# Patient Record
Sex: MALE | Race: WHITE | NOT HISPANIC OR LATINO | Employment: FULL TIME | URBAN - METROPOLITAN AREA
[De-identification: names, ages, dates, MRNs, and addresses within clinical notes are randomized per-mention and may not be internally consistent; named-entity substitution may affect disease eponyms.]

---

## 2024-02-28 ENCOUNTER — OFFICE VISIT (OUTPATIENT)
Dept: PRIMARY CARE | Facility: CLINIC | Age: 34
End: 2024-02-28
Payer: COMMERCIAL

## 2024-02-28 VITALS
SYSTOLIC BLOOD PRESSURE: 122 MMHG | DIASTOLIC BLOOD PRESSURE: 60 MMHG | WEIGHT: 249.8 LBS | HEIGHT: 69 IN | OXYGEN SATURATION: 97 % | HEART RATE: 85 BPM | BODY MASS INDEX: 37 KG/M2

## 2024-02-28 DIAGNOSIS — Z00.00 ROUTINE GENERAL MEDICAL EXAMINATION AT A HEALTH CARE FACILITY: Primary | ICD-10-CM

## 2024-02-28 DIAGNOSIS — Z80.0 FAMILY HISTORY OF PANCREATIC CANCER: ICD-10-CM

## 2024-02-28 DIAGNOSIS — F90.9 ATTENTION DEFICIT HYPERACTIVITY DISORDER (ADHD), UNSPECIFIED ADHD TYPE: ICD-10-CM

## 2024-02-28 DIAGNOSIS — Z11.3 SCREEN FOR STD (SEXUALLY TRANSMITTED DISEASE): ICD-10-CM

## 2024-02-28 DIAGNOSIS — F51.01 PRIMARY INSOMNIA: ICD-10-CM

## 2024-02-28 DIAGNOSIS — F32.A DEPRESSION, UNSPECIFIED DEPRESSION TYPE: ICD-10-CM

## 2024-02-28 PROCEDURE — 1036F TOBACCO NON-USER: CPT | Performed by: FAMILY MEDICINE

## 2024-02-28 PROCEDURE — 99385 PREV VISIT NEW AGE 18-39: CPT | Performed by: FAMILY MEDICINE

## 2024-02-28 RX ORDER — BUPROPION HYDROCHLORIDE 150 MG/1
150 TABLET ORAL DAILY
COMMUNITY
End: 2024-04-24 | Stop reason: DRUGHIGH

## 2024-02-28 RX ORDER — BUPROPION HYDROCHLORIDE 300 MG/1
300 TABLET ORAL EVERY MORNING
Qty: 30 TABLET | Refills: 2 | Status: SHIPPED | OUTPATIENT
Start: 2024-02-28

## 2024-02-28 RX ORDER — TRAZODONE HYDROCHLORIDE 50 MG/1
TABLET ORAL
Qty: 60 TABLET | Refills: 2 | Status: SHIPPED | OUTPATIENT
Start: 2024-02-28 | End: 2024-05-01 | Stop reason: WASHOUT

## 2024-02-28 NOTE — PROGRESS NOTES
Subjective   Patient ID: Brennon David is a 33 y.o. male who presents for Establish Care, ADHD, Depression, and Exposure to STD.///Annual exam and checkup    HPI annual exam and checkup    Pt is here to establish care   He denies ever having cardiac issues.   He vapes nicotine and THC.    Pt is requesting to get a cancer screening  He is requesting BRCA testing.  His mom had pancreatic cancer.      Pt would like to get std testing and blood work   He dates girls  Denies burning, discharge from penis.     PT would like to discuss an option for his ADHD and depression.   He states 2 years ago he started losing motivation and weight.  He did get put on Wellbutrin 2-3 months ago.  He was treated for ADHD when he was a kid and in his 20s.  He states the Adderall made him jittery and made him anxious.     States he does not sleep well.   He felt drowsy around 10 but then he would not have woken up for his appointment.  His father had sleep apnea prior to losing weight.     Review of systems  ; Patient seen today for exam denies any problems with headaches or vision, denies any shortness of breath chest pain nausea or vomiting, no black stool no blood in the stool no heartburn type symptoms denies any problems with constipation or diarrhea, and no dysuria-type symptoms    The patient's allergies medications were reviewed with them today    The patient's social family and surgical history or also reviewed here today, along with her past medical history.     Objective     Alert and active in  no acute distress  HEENT TMs clear oropharynx negative nares clear no drainage noted neck supple  With no adenopathy   Heart regular rate and rhythm without murmur and no carotid bruits  Lungs- clear to auscultation bilaterally, no wheeze or rhonchi noted  Thyroid -negative masses or nodularity  Abdomen- soft times four quadrants , bowel sounds positive no masses or organomegaly, negative tenderness guarding or rebound  Neurological  "exam unremarkable- DTRs in upper and lower extremities within normal limits.   skin -no lesions noted      /60   Pulse 85   Ht 1.753 m (5' 9\")   Wt 113 kg (249 lb 12.8 oz)   SpO2 97%   BMI 36.89 kg/m²     Allergies   Allergen Reactions    Adderall [Dextroamphetamine-Amphetamine] Other     Jittery, anxious       Assessment/Plan   Problem List Items Addressed This Visit    None  Visit Diagnoses       Routine general medical examination at a health care facility        Relevant Orders    CBC and Auto Differential    Comprehensive Metabolic Panel    Lipid Panel    Attention deficit hyperactivity disorder (ADHD), unspecified ADHD type        Depression, unspecified depression type        Relevant Medications    buPROPion XL (Wellbutrin XL) 300 mg 24 hr tablet    Other Relevant Orders    Follow Up In [facility]    TSH with reflex to Free T4 if abnormal    Family history of pancreatic cancer        Relevant Orders    Referral to Genetics    Primary insomnia        Relevant Medications    traZODone (Desyrel) 50 mg tablet    Screen for STD (sexually transmitted disease)        Relevant Orders    HIV 1/2 Antigen/Antibody Screen with Reflex to Confirmation    Hepatitis C Antibody    Syphilis Screen with Reflex    HSV1 IgG and HSV2 IgG          Importance of smoking cessation discussed.     Recommend he discontinue Marijuana use.     Referral to [facility] Lim ordered.    Increase Wellbutrin to 300 mg.     Referral to genetics ordered.    Trazodone prescribed today.     Discussed a sleep pattern to help him fall asleep more easily.     Discussed safe sex practices.    Labs have been ordered, she/he will have these performed and we will contact her/him with results.  (CBC, CMP, Lipid, TSH w Reflex, STD testing)    If anything worsens or changes please call us at once, follow up in the office as planned.    Scribe Attestation  By signing my name below, Yee ALEJANDRA " ALICE Castano, Teresa   attest that this documentation has been prepared under the direction and in the presence of Arya Dawson DO.

## 2024-03-01 ENCOUNTER — LAB (OUTPATIENT)
Dept: LAB | Facility: LAB | Age: 34
End: 2024-03-01
Payer: COMMERCIAL

## 2024-03-01 DIAGNOSIS — Z00.00 ROUTINE GENERAL MEDICAL EXAMINATION AT A HEALTH CARE FACILITY: ICD-10-CM

## 2024-03-01 DIAGNOSIS — F32.A DEPRESSION, UNSPECIFIED DEPRESSION TYPE: ICD-10-CM

## 2024-03-01 DIAGNOSIS — Z11.3 SCREEN FOR STD (SEXUALLY TRANSMITTED DISEASE): ICD-10-CM

## 2024-03-01 LAB
ALBUMIN SERPL BCP-MCNC: 4.5 G/DL (ref 3.4–5)
ALP SERPL-CCNC: 52 U/L (ref 33–120)
ALT SERPL W P-5'-P-CCNC: 23 U/L (ref 10–52)
ANION GAP SERPL CALC-SCNC: 12 MMOL/L (ref 10–20)
AST SERPL W P-5'-P-CCNC: 19 U/L (ref 9–39)
BASOPHILS # BLD AUTO: 0.05 X10*3/UL (ref 0–0.1)
BASOPHILS NFR BLD AUTO: 0.7 %
BILIRUB SERPL-MCNC: 0.5 MG/DL (ref 0–1.2)
BUN SERPL-MCNC: 10 MG/DL (ref 6–23)
CALCIUM SERPL-MCNC: 9.5 MG/DL (ref 8.6–10.3)
CHLORIDE SERPL-SCNC: 105 MMOL/L (ref 98–107)
CHOLEST SERPL-MCNC: 189 MG/DL (ref 0–199)
CHOLESTEROL/HDL RATIO: 3.4
CO2 SERPL-SCNC: 27 MMOL/L (ref 21–32)
CREAT SERPL-MCNC: 1.16 MG/DL (ref 0.5–1.3)
EGFRCR SERPLBLD CKD-EPI 2021: 85 ML/MIN/1.73M*2
EOSINOPHIL # BLD AUTO: 0.16 X10*3/UL (ref 0–0.7)
EOSINOPHIL NFR BLD AUTO: 2.4 %
ERYTHROCYTE [DISTWIDTH] IN BLOOD BY AUTOMATED COUNT: 12.1 % (ref 11.5–14.5)
GLUCOSE SERPL-MCNC: 98 MG/DL (ref 74–99)
HCT VFR BLD AUTO: 45.6 % (ref 41–52)
HDLC SERPL-MCNC: 55.2 MG/DL
HGB BLD-MCNC: 15.6 G/DL (ref 13.5–17.5)
IMM GRANULOCYTES # BLD AUTO: 0.04 X10*3/UL (ref 0–0.7)
IMM GRANULOCYTES NFR BLD AUTO: 0.6 % (ref 0–0.9)
LDLC SERPL CALC-MCNC: 117 MG/DL
LYMPHOCYTES # BLD AUTO: 1.97 X10*3/UL (ref 1.2–4.8)
LYMPHOCYTES NFR BLD AUTO: 29.4 %
MCH RBC QN AUTO: 31.3 PG (ref 26–34)
MCHC RBC AUTO-ENTMCNC: 34.2 G/DL (ref 32–36)
MCV RBC AUTO: 91 FL (ref 80–100)
MONOCYTES # BLD AUTO: 0.55 X10*3/UL (ref 0.1–1)
MONOCYTES NFR BLD AUTO: 8.2 %
NEUTROPHILS # BLD AUTO: 3.93 X10*3/UL (ref 1.2–7.7)
NEUTROPHILS NFR BLD AUTO: 58.7 %
NON HDL CHOLESTEROL: 134 MG/DL (ref 0–149)
NRBC BLD-RTO: 0 /100 WBCS (ref 0–0)
PLATELET # BLD AUTO: 205 X10*3/UL (ref 150–450)
POTASSIUM SERPL-SCNC: 4.1 MMOL/L (ref 3.5–5.3)
PROT SERPL-MCNC: 6.4 G/DL (ref 6.4–8.2)
RBC # BLD AUTO: 4.99 X10*6/UL (ref 4.5–5.9)
SODIUM SERPL-SCNC: 140 MMOL/L (ref 136–145)
TRIGL SERPL-MCNC: 83 MG/DL (ref 0–149)
TSH SERPL-ACNC: 0.72 MIU/L (ref 0.44–3.98)
VLDL: 17 MG/DL (ref 0–40)
WBC # BLD AUTO: 6.7 X10*3/UL (ref 4.4–11.3)

## 2024-03-01 PROCEDURE — 86695 HERPES SIMPLEX TYPE 1 TEST: CPT

## 2024-03-01 PROCEDURE — 80053 COMPREHEN METABOLIC PANEL: CPT

## 2024-03-01 PROCEDURE — 86780 TREPONEMA PALLIDUM: CPT

## 2024-03-01 PROCEDURE — 85025 COMPLETE CBC W/AUTO DIFF WBC: CPT

## 2024-03-01 PROCEDURE — 87389 HIV-1 AG W/HIV-1&-2 AB AG IA: CPT

## 2024-03-01 PROCEDURE — 86696 HERPES SIMPLEX TYPE 2 TEST: CPT

## 2024-03-01 PROCEDURE — 80061 LIPID PANEL: CPT

## 2024-03-01 PROCEDURE — 36415 COLL VENOUS BLD VENIPUNCTURE: CPT

## 2024-03-01 PROCEDURE — 86803 HEPATITIS C AB TEST: CPT

## 2024-03-01 PROCEDURE — 84443 ASSAY THYROID STIM HORMONE: CPT

## 2024-03-02 LAB
HCV AB SER QL: NONREACTIVE
HERPES SIMPLEX VIRUS 1 IGG: 0.2 INDEX
HERPES SIMPLEX VIRUS 2 IGG: <0.2 INDEX
HIV 1+2 AB+HIV1 P24 AG SERPL QL IA: NONREACTIVE
TREPONEMA PALLIDUM IGG+IGM AB [PRESENCE] IN SERUM OR PLASMA BY IMMUNOASSAY: NONREACTIVE

## 2024-03-18 ENCOUNTER — SOCIAL WORK (OUTPATIENT)
Dept: PRIMARY CARE | Facility: CLINIC | Age: 34
End: 2024-03-18
Payer: COMMERCIAL

## 2024-03-18 ASSESSMENT — PATIENT HEALTH QUESTIONNAIRE - PHQ9
10. IF YOU CHECKED OFF ANY PROBLEMS, HOW DIFFICULT HAVE THESE PROBLEMS MADE IT FOR YOU TO DO YOUR WORK, TAKE CARE OF THINGS AT HOME, OR GET ALONG WITH OTHER PEOPLE: VERY DIFFICULT
3. TROUBLE FALLING OR STAYING ASLEEP: NEARLY EVERY DAY
8. MOVING OR SPEAKING SO SLOWLY THAT OTHER PEOPLE COULD HAVE NOTICED. OR THE OPPOSITE, BEING SO FIGETY OR RESTLESS THAT YOU HAVE BEEN MOVING AROUND A LOT MORE THAN USUAL: SEVERAL DAYS
2. FEELING DOWN, DEPRESSED OR HOPELESS: NEARLY EVERY DAY
7. TROUBLE CONCENTRATING ON THINGS, SUCH AS READING THE NEWSPAPER OR WATCHING TELEVISION: NEARLY EVERY DAY
9. THOUGHTS THAT YOU WOULD BE BETTER OFF DEAD, OR OF HURTING YOURSELF: NOT AT ALL
6. FEELING BAD ABOUT YOURSELF - OR THAT YOU ARE A FAILURE OR HAVE LET YOURSELF OR YOUR FAMILY DOWN: NEARLY EVERY DAY
SUM OF ALL RESPONSES TO PHQ9 QUESTIONS 1 & 2: 6
1. LITTLE INTEREST OR PLEASURE IN DOING THINGS: NEARLY EVERY DAY
SUM OF ALL RESPONSES TO PHQ QUESTIONS 1-9: 22
4. FEELING TIRED OR HAVING LITTLE ENERGY: NEARLY EVERY DAY
5. POOR APPETITE OR OVEREATING: NEARLY EVERY DAY

## 2024-03-18 ASSESSMENT — ANXIETY QUESTIONNAIRES
IF YOU CHECKED OFF ANY PROBLEMS ON THIS QUESTIONNAIRE, HOW DIFFICULT HAVE THESE PROBLEMS MADE IT FOR YOU TO DO YOUR WORK, TAKE CARE OF THINGS AT HOME, OR GET ALONG WITH OTHER PEOPLE: SOMEWHAT DIFFICULT
6. BECOMING EASILY ANNOYED OR IRRITABLE: SEVERAL DAYS
2. NOT BEING ABLE TO STOP OR CONTROL WORRYING: NOT AT ALL
1. FEELING NERVOUS, ANXIOUS, OR ON EDGE: MORE THAN HALF THE DAYS
GAD7 TOTAL SCORE: 6
4. TROUBLE RELAXING: NOT AT ALL
5. BEING SO RESTLESS THAT IT IS HARD TO SIT STILL: MORE THAN HALF THE DAYS
3. WORRYING TOO MUCH ABOUT DIFFERENT THINGS: SEVERAL DAYS
7. FEELING AFRAID AS IF SOMETHING AWFUL MIGHT HAPPEN: NOT AT ALL
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
## 2024-03-27 ENCOUNTER — DOCUMENTATION (OUTPATIENT)
Dept: PRIMARY CARE | Facility: CLINIC | Age: 34
End: 2024-03-27
Payer: COMMERCIAL

## 2024-03-27 DIAGNOSIS — F33.1 MODERATE EPISODE OF RECURRENT MAJOR DEPRESSIVE DISORDER (MULTI): Primary | ICD-10-CM

## 2024-03-27 PROCEDURE — 99494 1ST/SBSQ PSYC COLLAB CARE: CPT | Performed by: FAMILY MEDICINE

## 2024-03-27 PROCEDURE — 99492 1ST PSYC COLLAB CARE MGMT: CPT | Performed by: FAMILY MEDICINE

## 2024-04-01 ENCOUNTER — SOCIAL WORK (OUTPATIENT)
Dept: PRIMARY CARE | Facility: CLINIC | Age: 34
End: 2024-04-01
Payer: COMMERCIAL

## 2024-04-01 ENCOUNTER — DOCUMENTATION (OUTPATIENT)
Dept: BEHAVIORAL HEALTH | Facility: HOSPITAL | Age: 34
End: 2024-04-01

## 2024-04-01 ASSESSMENT — PATIENT HEALTH QUESTIONNAIRE - PHQ9
2. FEELING DOWN, DEPRESSED OR HOPELESS: MORE THAN HALF THE DAYS
3. TROUBLE FALLING OR STAYING ASLEEP: MORE THAN HALF THE DAYS
SUM OF ALL RESPONSES TO PHQ QUESTIONS 1-9: 15
1. LITTLE INTEREST OR PLEASURE IN DOING THINGS: NEARLY EVERY DAY
8. MOVING OR SPEAKING SO SLOWLY THAT OTHER PEOPLE COULD HAVE NOTICED. OR THE OPPOSITE, BEING SO FIGETY OR RESTLESS THAT YOU HAVE BEEN MOVING AROUND A LOT MORE THAN USUAL: NOT AT ALL
4. FEELING TIRED OR HAVING LITTLE ENERGY: NEARLY EVERY DAY
SUM OF ALL RESPONSES TO PHQ9 QUESTIONS 1 & 2: 5
5. POOR APPETITE OR OVEREATING: MORE THAN HALF THE DAYS
7. TROUBLE CONCENTRATING ON THINGS, SUCH AS READING THE NEWSPAPER OR WATCHING TELEVISION: MORE THAN HALF THE DAYS
9. THOUGHTS THAT YOU WOULD BE BETTER OFF DEAD, OR OF HURTING YOURSELF: NOT AT ALL
10. IF YOU CHECKED OFF ANY PROBLEMS, HOW DIFFICULT HAVE THESE PROBLEMS MADE IT FOR YOU TO DO YOUR WORK, TAKE CARE OF THINGS AT HOME, OR GET ALONG WITH OTHER PEOPLE: VERY DIFFICULT
6. FEELING BAD ABOUT YOURSELF - OR THAT YOU ARE A FAILURE OR HAVE LET YOURSELF OR YOUR FAMILY DOWN: SEVERAL DAYS

## 2024-04-01 ASSESSMENT — ANXIETY QUESTIONNAIRES
7. FEELING AFRAID AS IF SOMETHING AWFUL MIGHT HAPPEN: NOT AT ALL
5. BEING SO RESTLESS THAT IT IS HARD TO SIT STILL: NOT AT ALL
GAD7 TOTAL SCORE: 3
IF YOU CHECKED OFF ANY PROBLEMS ON THIS QUESTIONNAIRE, HOW DIFFICULT HAVE THESE PROBLEMS MADE IT FOR YOU TO DO YOUR WORK, TAKE CARE OF THINGS AT HOME, OR GET ALONG WITH OTHER PEOPLE: SOMEWHAT DIFFICULT
2. NOT BEING ABLE TO STOP OR CONTROL WORRYING: NOT AT ALL
6. BECOMING EASILY ANNOYED OR IRRITABLE: SEVERAL DAYS
4. TROUBLE RELAXING: NOT AT ALL
3. WORRYING TOO MUCH ABOUT DIFFERENT THINGS: NOT AT ALL
1. FEELING NERVOUS, ANXIOUS, OR ON EDGE: MORE THAN HALF THE DAYS

## 2024-04-01 NOTE — PROGRESS NOTES
"Collaborative Care (CoCM)  Progress Note    Type of Interaction: In Office    Start Time: 1:57 PM    End Time: 2:36 PM    Appointment: Scheduled    Reason for Visit:   Chief Complaint   Patient presents with    Depression    Review of psych consult recommendations and introduction into BBT-I/CBT-I    Interval History / Patient Symptoms:   Pt has low energy and motivation to follow through on tasks.  Pt reports he has reduced his caffeine use over the past few weeks and took for the first time today in a week and noted feeling \"jittery\".      Patient Health Questionnaire-9 Score: 15 (4/1/2024  1:56 PM)  ARAMIS-7 Total Score: 3 (4/1/2024  1:56 PM)    Interventions Provided: Psychoeducation, Behavioral Activation, Motivational Interviewing, Strengths Exploration, Values Exploration, Treatment Planning, and Psychoeducation on CBT-i/ sleep hygiene tips provided and bx changes to improve sleep quality.   Identified pt's motivation for change of sleep bxs and explored values/things he enjoys.  \"Wake up refreshed\" psychoeducation provided.  Applauded pt for already taking action with reducing caffeine intake.    Progress Made: Minimum    Response to Intervention: Pt has noticed some change with anxiety since reducing caffeine use.  Pt interested and willing to try strategies to improve his sleep with understanding it negatively can affect MH.      Plan: Maintain CBT-i and sleep quality improvement.  Pt to get out of bed if he can't sleep and to set an alarm to avoid oversleeping past 8 hours.  Work on daily schedule.    Patient Instructions   Please review info from email.     Follow Up / Next Appointment: Next appointment: 04/15/24  "

## 2024-04-01 NOTE — PROGRESS NOTES
[facility] Psychiatry Consult Note     Brennon David is a 33 y.o., referred to Collaborative Care for symptoms of depression and anxiety. I have reviewed the patient with the behavioral health manager and reviewed the patient's electronic record.    Currently experiencing depression with decreased sleep, social withdrawal, difficulty concentrating, and lack of motivation. Also has a reversed sleep-wake schedule currently. Cannot always take meds at appropriate times due to this.     No S/HI currently.     History of wide weight fluctuations but none recently.    Current psychiatric medications: Bupropion  mg QAM (increased from 150 mg on 2/28); Trazodone  mg at bedtime. Says that trazodone does not help him fall asleep.    Multiple psychosocial stressors currently, and much past trauma.    Recommendations:  - biggest problem right now is lack of sleep, which is likely affecting his mood and depressive symptoms negatively:        1. Would engage with [facility] in insomnia-CBT; also with an online module (look up I-CBT).          2. Need to improve sleep hygiene          3. No energy drinks after 2pm         4. Can increase trazodone to 150 mg at bedtime         5. Could also use high-dose melatonin (5-10 mg taken at 6pm every evening) to help reset sleep-wake cycle.            6. For depression, could increase bupropion XL to the max dose of 450 mg every morning, but should take it consistently every morning, and before 12pm, such that it will not keep him up at night.         Patient Health Questionnaire-9 Score: 15 (4/1/2024  1:56 PM)  ARAMIS-7 Total Score: 3 (4/1/2024  1:56 PM)      The above treatment considerations and suggestions are based on consultations with the patient's care manager and a review of information available in the electronic medical record. I have not personally examined the patient. All recommendations should be implemented with consideration of the patient's relevant prior history and current  clinical status. Please feel free to call me with any questions about the care of this patient. I can easily be reached through [employer] or at [email or web address]

## 2024-04-15 ENCOUNTER — SOCIAL WORK (OUTPATIENT)
Dept: PRIMARY CARE | Facility: CLINIC | Age: 34
End: 2024-04-15
Payer: COMMERCIAL

## 2024-04-15 ASSESSMENT — ANXIETY QUESTIONNAIRES
1. FEELING NERVOUS, ANXIOUS, OR ON EDGE: MORE THAN HALF THE DAYS
GAD7 TOTAL SCORE: 5
7. FEELING AFRAID AS IF SOMETHING AWFUL MIGHT HAPPEN: NOT AT ALL
2. NOT BEING ABLE TO STOP OR CONTROL WORRYING: SEVERAL DAYS
IF YOU CHECKED OFF ANY PROBLEMS ON THIS QUESTIONNAIRE, HOW DIFFICULT HAVE THESE PROBLEMS MADE IT FOR YOU TO DO YOUR WORK, TAKE CARE OF THINGS AT HOME, OR GET ALONG WITH OTHER PEOPLE: VERY DIFFICULT
3. WORRYING TOO MUCH ABOUT DIFFERENT THINGS: SEVERAL DAYS
5. BEING SO RESTLESS THAT IT IS HARD TO SIT STILL: NOT AT ALL
6. BECOMING EASILY ANNOYED OR IRRITABLE: SEVERAL DAYS
4. TROUBLE RELAXING: NOT AT ALL

## 2024-04-15 ASSESSMENT — PATIENT HEALTH QUESTIONNAIRE - PHQ9
9. THOUGHTS THAT YOU WOULD BE BETTER OFF DEAD, OR OF HURTING YOURSELF: NOT AT ALL
3. TROUBLE FALLING OR STAYING ASLEEP: MORE THAN HALF THE DAYS
4. FEELING TIRED OR HAVING LITTLE ENERGY: NEARLY EVERY DAY
6. FEELING BAD ABOUT YOURSELF - OR THAT YOU ARE A FAILURE OR HAVE LET YOURSELF OR YOUR FAMILY DOWN: MORE THAN HALF THE DAYS
8. MOVING OR SPEAKING SO SLOWLY THAT OTHER PEOPLE COULD HAVE NOTICED. OR THE OPPOSITE, BEING SO FIGETY OR RESTLESS THAT YOU HAVE BEEN MOVING AROUND A LOT MORE THAN USUAL: NOT AT ALL
SUM OF ALL RESPONSES TO PHQ9 QUESTIONS 1 & 2: 5
1. LITTLE INTEREST OR PLEASURE IN DOING THINGS: NEARLY EVERY DAY
10. IF YOU CHECKED OFF ANY PROBLEMS, HOW DIFFICULT HAVE THESE PROBLEMS MADE IT FOR YOU TO DO YOUR WORK, TAKE CARE OF THINGS AT HOME, OR GET ALONG WITH OTHER PEOPLE: VERY DIFFICULT
5. POOR APPETITE OR OVEREATING: MORE THAN HALF THE DAYS
7. TROUBLE CONCENTRATING ON THINGS, SUCH AS READING THE NEWSPAPER OR WATCHING TELEVISION: NEARLY EVERY DAY
2. FEELING DOWN, DEPRESSED OR HOPELESS: MORE THAN HALF THE DAYS
SUM OF ALL RESPONSES TO PHQ QUESTIONS 1-9: 17

## 2024-04-15 NOTE — PROGRESS NOTES
"Collaborative Care (CoCM)  Progress Note    Type of Interaction: In Office    Start Time: 11:35 AM    End Time: 12:28 PM    Appointment: Scheduled    Reason for Visit:   Chief Complaint   Patient presents with    Depression    Review of psych consult recommendations, tx planning    Interval History / Patient Symptoms:   Pt reports improvements with his sleep quality; pt notes issues with excessive sweating and may reach out to speak to PCP about this, he does not take Trazodone; pt now sleeping 6-9 hours a night and waking up rested but still dealing with some daytime fatigue half way thru day.  Pt has drastically reduced his caffeine intake daily.  Pt working towards getting a security job.  Pt reports \"going through the motions\" but still no desire to engage in activities he knows are good for him.  Pt still going all day without eating.    Patient Health Questionnaire-9 Score: 17 (4/15/2024  1:06 PM)  ARAMIS-7 Total Score: 5 (4/15/2024  1:06 PM)      Interventions Provided: Psychoeducation, Behavioral Activation, Motivational Interviewing, Review Progress/Goals Stress Management, and CBT-I review and progress made.  BA on ways to start doing things pt used to enjoy and implementing healthier lifestyle choices to improve MH sx.  Reviewed psych consult recommendations.       Progress Made: Minimum    Response to Intervention: Pt has made improvements with his wake up time and reported improved sleep quality.  Pt reported he would consider medication recommendation and understands he needs to discuss with PCP before making any changes to his Wellbutrin.  Pt reported still depressed mood each day but improved sleep overall.  Pt reports he plans to move as living with current ex boss is not going well.  Pt discussed he will consider ways to have snacks or meals earlier in the day and will discuss at next appointment.  Pt reported other ppl have noticed a positive change in him but he reports feeling no different.  Pt " curious about ADHD and BAARS were provided.    Plan: Provided pt with BAARS to complete and bring to next session and pt inquiring about ADHD sx.  Pt to work on BA goals of making a meal/snack during day time and keeping consistent wake up time.  BA to go outside and for walk/ maintain dance.    Patient Instructions   BA goals to make a meal during daytime and maintain sleep schedule improvements.  Follow Up / Next Appointment: Next appointment: 04/29/24

## 2024-04-23 NOTE — PROGRESS NOTES
"Subjective   Patient ID: Brennon David \"Alessandra" is a 33 y.o. male who presents for Sore Throat.    HPI    Patient presents today for sore throat.  Ongoing for over 1 week   Other symptoms include headaches, sweating, ear pain  Pt states that feels like he has razor blade in his throat   Pt states having for first 4 day with fever   Pt does not have a fever today   Pt did not test for Covid  Has tried Excedrin, Motrin minimal relief   He did have dental work 1 month, a deep cleaning.     No other concern /question   Review of systems  ; Patient seen today for exam denies any problems with headaches or vision, denies any shortness of breath chest pain nausea or vomiting, no black stool no blood in the stool no heartburn type symptoms denies any problems with constipation or diarrhea, and no dysuria-type symptoms    The patient's allergies medications were reviewed with them today    The patient's social family and surgical history or also reviewed here today, along with her past medical history.     Objective     Alert and active in  no acute distress  HEENT  bilateral serous otitis,  right lymphadenopathy positive uvula swollen   Heart regular rate and rhythm without murmur and no carotid bruits  Lungs- clear to auscultation bilaterally, no wheeze or rhonchi noted  Thyroid -negative masses or nodularity    skin -no lesions noted      /70 (BP Location: Right arm, Patient Position: Sitting, BP Cuff Size: Adult)   Pulse 87   Temp 36.2 °C (97.1 °F) (Temporal)   Resp 16   Ht 1.753 m (5' 9\")   Wt 107 kg (235 lb)   SpO2 95%   BMI 34.70 kg/m²     No Known Allergies      Assessment/Plan   Problem List Items Addressed This Visit       Class 1 obesity due to excess calories without serious comorbidity with body mass index (BMI) of 34.0 to 34.9 in adult    BMI 34.0-34.9,adult     Other Visit Diagnoses       Sore throat        Bilateral acute serous otitis media, recurrence not specified        Relevant Medications    " amoxicillin-pot clavulanate (Augmentin) 875-125 mg tablet    Lymphadenopathy of right cervical region        Relevant Medications    methylPREDNISolone (Medrol Dospak) 4 mg tablets    amoxicillin-pot clavulanate (Augmentin) 875-125 mg tablet    Moderate episode of recurrent major depressive disorder (Multi)              Discussed patient's BMI and to institute calorie reduction and increase exercise to decrease risk of diabetes and heart disease in the future.    Recommend gargling with Scope or Listerine.    Alternate Tylenol and Advil every 3 hours.     Augmentin, Medrol dosepak prescribed today.     Recommend waiting for ADD evaluation to make changes to Wellbutrin.     If anything worsens or changes please call us at once, follow up in the office as planned.    Scribe Attestation  By signing my name below, I, Yee Castano MA, Scribe   attest that this documentation has been prepared under the direction and in the presence of Arya Dawson DO.

## 2024-04-24 ENCOUNTER — OFFICE VISIT (OUTPATIENT)
Dept: PRIMARY CARE | Facility: CLINIC | Age: 34
End: 2024-04-24
Payer: COMMERCIAL

## 2024-04-24 VITALS
OXYGEN SATURATION: 95 % | SYSTOLIC BLOOD PRESSURE: 102 MMHG | HEART RATE: 87 BPM | RESPIRATION RATE: 16 BRPM | TEMPERATURE: 97.1 F | BODY MASS INDEX: 34.8 KG/M2 | DIASTOLIC BLOOD PRESSURE: 70 MMHG | WEIGHT: 235 LBS | HEIGHT: 69 IN

## 2024-04-24 DIAGNOSIS — H65.03 BILATERAL ACUTE SEROUS OTITIS MEDIA, RECURRENCE NOT SPECIFIED: ICD-10-CM

## 2024-04-24 DIAGNOSIS — E66.09 CLASS 1 OBESITY DUE TO EXCESS CALORIES WITHOUT SERIOUS COMORBIDITY WITH BODY MASS INDEX (BMI) OF 34.0 TO 34.9 IN ADULT: ICD-10-CM

## 2024-04-24 DIAGNOSIS — F33.1 MODERATE EPISODE OF RECURRENT MAJOR DEPRESSIVE DISORDER (MULTI): ICD-10-CM

## 2024-04-24 DIAGNOSIS — J02.9 SORE THROAT: ICD-10-CM

## 2024-04-24 DIAGNOSIS — R59.0 LYMPHADENOPATHY OF RIGHT CERVICAL REGION: ICD-10-CM

## 2024-04-24 PROBLEM — E66.811 CLASS 1 OBESITY DUE TO EXCESS CALORIES WITHOUT SERIOUS COMORBIDITY WITH BODY MASS INDEX (BMI) OF 34.0 TO 34.9 IN ADULT: Status: ACTIVE | Noted: 2024-04-24

## 2024-04-24 PROCEDURE — 3008F BODY MASS INDEX DOCD: CPT | Performed by: FAMILY MEDICINE

## 2024-04-24 PROCEDURE — 1036F TOBACCO NON-USER: CPT | Performed by: FAMILY MEDICINE

## 2024-04-24 PROCEDURE — 99213 OFFICE O/P EST LOW 20 MIN: CPT | Performed by: FAMILY MEDICINE

## 2024-04-24 RX ORDER — METHYLPREDNISOLONE 4 MG/1
TABLET ORAL
Qty: 21 TABLET | Refills: 0 | Status: SHIPPED | OUTPATIENT
Start: 2024-04-24 | End: 2024-05-01 | Stop reason: WASHOUT

## 2024-04-24 RX ORDER — AMOXICILLIN AND CLAVULANATE POTASSIUM 875; 125 MG/1; MG/1
875 TABLET, FILM COATED ORAL 2 TIMES DAILY
Qty: 20 TABLET | Refills: 0 | Status: SHIPPED | OUTPATIENT
Start: 2024-04-24 | End: 2024-05-04

## 2024-04-24 ASSESSMENT — PATIENT HEALTH QUESTIONNAIRE - PHQ9
SUM OF ALL RESPONSES TO PHQ9 QUESTIONS 1 AND 2: 0
2. FEELING DOWN, DEPRESSED OR HOPELESS: NOT AT ALL
1. LITTLE INTEREST OR PLEASURE IN DOING THINGS: NOT AT ALL

## 2024-04-29 ENCOUNTER — SOCIAL WORK (OUTPATIENT)
Dept: PRIMARY CARE | Facility: CLINIC | Age: 34
End: 2024-04-29
Payer: COMMERCIAL

## 2024-04-29 ASSESSMENT — PATIENT HEALTH QUESTIONNAIRE - PHQ9
1. LITTLE INTEREST OR PLEASURE IN DOING THINGS: SEVERAL DAYS
SUM OF ALL RESPONSES TO PHQ9 QUESTIONS 1 & 2: 2
8. MOVING OR SPEAKING SO SLOWLY THAT OTHER PEOPLE COULD HAVE NOTICED. OR THE OPPOSITE, BEING SO FIGETY OR RESTLESS THAT YOU HAVE BEEN MOVING AROUND A LOT MORE THAN USUAL: MORE THAN HALF THE DAYS
3. TROUBLE FALLING OR STAYING ASLEEP: NEARLY EVERY DAY
6. FEELING BAD ABOUT YOURSELF - OR THAT YOU ARE A FAILURE OR HAVE LET YOURSELF OR YOUR FAMILY DOWN: MORE THAN HALF THE DAYS
5. POOR APPETITE OR OVEREATING: NEARLY EVERY DAY
7. TROUBLE CONCENTRATING ON THINGS, SUCH AS READING THE NEWSPAPER OR WATCHING TELEVISION: NEARLY EVERY DAY
2. FEELING DOWN, DEPRESSED OR HOPELESS: SEVERAL DAYS
9. THOUGHTS THAT YOU WOULD BE BETTER OFF DEAD, OR OF HURTING YOURSELF: NOT AT ALL
SUM OF ALL RESPONSES TO PHQ QUESTIONS 1-9: 16
4. FEELING TIRED OR HAVING LITTLE ENERGY: SEVERAL DAYS
10. IF YOU CHECKED OFF ANY PROBLEMS, HOW DIFFICULT HAVE THESE PROBLEMS MADE IT FOR YOU TO DO YOUR WORK, TAKE CARE OF THINGS AT HOME, OR GET ALONG WITH OTHER PEOPLE: SOMEWHAT DIFFICULT

## 2024-04-29 ASSESSMENT — ANXIETY QUESTIONNAIRES
IF YOU CHECKED OFF ANY PROBLEMS ON THIS QUESTIONNAIRE, HOW DIFFICULT HAVE THESE PROBLEMS MADE IT FOR YOU TO DO YOUR WORK, TAKE CARE OF THINGS AT HOME, OR GET ALONG WITH OTHER PEOPLE: SOMEWHAT DIFFICULT
4. TROUBLE RELAXING: MORE THAN HALF THE DAYS
7. FEELING AFRAID AS IF SOMETHING AWFUL MIGHT HAPPEN: SEVERAL DAYS
2. NOT BEING ABLE TO STOP OR CONTROL WORRYING: NOT AT ALL
GAD7 TOTAL SCORE: 12
5. BEING SO RESTLESS THAT IT IS HARD TO SIT STILL: MORE THAN HALF THE DAYS
3. WORRYING TOO MUCH ABOUT DIFFERENT THINGS: MORE THAN HALF THE DAYS
1. FEELING NERVOUS, ANXIOUS, OR ON EDGE: MORE THAN HALF THE DAYS
6. BECOMING EASILY ANNOYED OR IRRITABLE: NEARLY EVERY DAY

## 2024-04-29 NOTE — PROGRESS NOTES
Collaborative Care (CoCM)  Progress Note    Type of Interaction: In Office    Start Time: 11:40 AM    End Time: 12:57 PM    Appointment: Scheduled    Reason for Visit:   Chief Complaint   Patient presents with    Depression    Anxiety    Follow up on sx and BAARS screener completion.  Identified recent stressors (excessive sweating) that impacts his anxiety and avoidance to enjoyable hobbies and socialization.      Interval History / Patient Symptoms:   Pt reports excessive sweating that prevents his from enjoyable hobbies and socialization.  Pt continuing to struggle with anxiety and depression sx.  Pt reports he has reduced his MJ use (not all throughout day, just in evening to help with appetite and sleep).  Pt reports he continues to reduce his caffeine use (some days none at all, some days a 5 hour energy or other energy drink and non after 2:00 PM.      Patient Health Questionnaire-9 Score: 16 (4/29/2024  1:53 PM)  ARAMIS-7 Total Score: 12 (4/29/2024  1:54 PM)      Interventions Provided: Psychoeducation, Problem Solving Treatment, Behavioral Activation, Motivational Interviewing, Develop Coping Strategies, and CBT-I skills provided; psychoeducation on MJ use and negative impacts on MH.  ACT provided for helpful vs unhelpful thought patterns.  Encouraged pt to discuss sweating issues with PCP.  BA on ways to engage in enjoyable hobbies despite negative thoughts.  Provided pt with relaxation activities to practice.  Discussed importance of daily schedule that aligns with pt's values.        Progress Made: Minimum    Response to Intervention: Pt reported he had made improvements with his sleep but was recently very sick and now sleep schedule impacted.  Pt willing to continue to work on improving this now that he is feeling better.  Pt discussed enjoying dancing but struggles with social anxiety in group setting and concerns about sweating.  Pt stated he may reach out to PCP.  Pt reports he wants to get back to  work and take steps toward  again as he can't get his own place until he has income.      Plan: Continue to review info with consulting psychiatrist and PCP.  Pt to engage in enjoyable hobbies outside the house.    Patient Instructions   Engage in enjoyable activities, outside the house.    Follow Up / Next Appointment: Next appointment: 05/13/24   No

## 2024-04-30 ENCOUNTER — TELEPHONE (OUTPATIENT)
Dept: PRIMARY CARE | Facility: CLINIC | Age: 34
End: 2024-04-30

## 2024-04-30 ENCOUNTER — DOCUMENTATION (OUTPATIENT)
Dept: PRIMARY CARE | Facility: CLINIC | Age: 34
End: 2024-04-30
Payer: COMMERCIAL

## 2024-04-30 DIAGNOSIS — F33.1 MODERATE EPISODE OF RECURRENT MAJOR DEPRESSIVE DISORDER (MULTI): Primary | ICD-10-CM

## 2024-04-30 DIAGNOSIS — F41.9 ANXIETY: ICD-10-CM

## 2024-04-30 PROCEDURE — 99493 SBSQ PSYC COLLAB CARE MGMT: CPT | Performed by: FAMILY MEDICINE

## 2024-04-30 PROCEDURE — 99494 1ST/SBSQ PSYC COLLAB CARE: CPT | Performed by: FAMILY MEDICINE

## 2024-04-30 NOTE — TELEPHONE ENCOUNTER
Please advise.    ----- Message from Brennon David sent at 4/30/2024  3:33 PM EDT -----  Regarding: Giant white spots in throat with increasing pain daily  Contact: [phone]  The pain in my throat is getting worse day by day. I have completed the steroid and still have a few days of the amoxicillan, but now there are these giant white spots in the back of my throat that have grown since my visit. I mentioned them to you and you said it was normal when I came in, but as I said the pain is just getting worse and they have grown significantly.

## 2024-05-01 ENCOUNTER — LAB (OUTPATIENT)
Dept: LAB | Facility: LAB | Age: 34
End: 2024-05-01
Payer: COMMERCIAL

## 2024-05-01 ENCOUNTER — OFFICE VISIT (OUTPATIENT)
Dept: PRIMARY CARE | Facility: CLINIC | Age: 34
End: 2024-05-01
Payer: COMMERCIAL

## 2024-05-01 VITALS
HEART RATE: 94 BPM | OXYGEN SATURATION: 99 % | SYSTOLIC BLOOD PRESSURE: 120 MMHG | DIASTOLIC BLOOD PRESSURE: 76 MMHG | HEIGHT: 69 IN | BODY MASS INDEX: 33.71 KG/M2 | TEMPERATURE: 98.7 F | WEIGHT: 227.6 LBS

## 2024-05-01 DIAGNOSIS — J03.90 TONSILLITIS: Primary | ICD-10-CM

## 2024-05-01 DIAGNOSIS — J02.9 SORE THROAT: ICD-10-CM

## 2024-05-01 DIAGNOSIS — E66.09 CLASS 1 OBESITY DUE TO EXCESS CALORIES WITHOUT SERIOUS COMORBIDITY WITH BODY MASS INDEX (BMI) OF 33.0 TO 33.9 IN ADULT: ICD-10-CM

## 2024-05-01 DIAGNOSIS — R53.83 OTHER FATIGUE: ICD-10-CM

## 2024-05-01 PROCEDURE — 86664 EPSTEIN-BARR NUCLEAR ANTIGEN: CPT

## 2024-05-01 PROCEDURE — 99214 OFFICE O/P EST MOD 30 MIN: CPT | Performed by: FAMILY MEDICINE

## 2024-05-01 PROCEDURE — 3008F BODY MASS INDEX DOCD: CPT | Performed by: FAMILY MEDICINE

## 2024-05-01 PROCEDURE — 86663 EPSTEIN-BARR ANTIBODY: CPT

## 2024-05-01 PROCEDURE — 86665 EPSTEIN-BARR CAPSID VCA: CPT

## 2024-05-01 PROCEDURE — 1036F TOBACCO NON-USER: CPT | Performed by: FAMILY MEDICINE

## 2024-05-01 PROCEDURE — 36415 COLL VENOUS BLD VENIPUNCTURE: CPT

## 2024-05-01 RX ORDER — CLINDAMYCIN HYDROCHLORIDE 300 MG/1
300 CAPSULE ORAL 3 TIMES DAILY
Qty: 30 CAPSULE | Refills: 0 | Status: SHIPPED | OUTPATIENT
Start: 2024-05-01 | End: 2024-05-11

## 2024-05-01 NOTE — PROGRESS NOTES
"Subjective   Patient ID: Brennon David \"Alessandra" is a 33 y.o. male who presents for Throat Problem.    HPI    Patient presents today for a sore throat. Symptoms have been present for 2 weeks, and are unchanged. On 4-24-24 was seen, and Rx'd Augmentin, and Medrol Dospack. States since then, he feels no better.   Also has headaches, loss of appetite due to the pain, and white spots on the back of his throat.  Has been feeling hot, but does not think he has a fever.     States he has 2-3 days left of the Augmentin, and the Medrol Dospack is done. Patient states he has an A/C unit in his room, but he has been waking up for the last 3 nights sweating profusely. Explained that this is a side effect from the Medrol Dospack, but he's concerned it's still happening after stopping.   He is gargling at night.     Has been taking OTC Ibuprofen for the pain, but is concerned he'll get an ulcer if he keeps taking it.     He has been told he snores.  He does some sometimes wake up from snoring.     No other concern /question    Review of systems  ; Patient seen today for exam denies any problems with headaches or vision, denies any shortness of breath chest pain nausea or vomiting, no black stool no blood in the stool no heartburn type symptoms denies any problems with constipation or diarrhea, and no dysuria-type symptoms    The patient's allergies medications were reviewed with them today    The patient's social family and surgical history or also reviewed here today, along with her past medical history.     Objective     Alert and active in  no acute distress  HEENT TMs clear oropharynx negative nares clear positive exudate tonsillar crypts bilaterally noted neck supple  With shotty anterior adenopathy tenderness to palpation   Heart regular rate and rhythm without murmur and no carotid bruits  Lungs- clear to auscultation bilaterally, no wheeze or rhonchi noted  Thyroid -negative masses or nodularity  Abdomen- soft times four " "quadrants , bowel sounds positive no masses or organomegaly, negative tenderness guarding or rebound        /76   Pulse 94   Temp 37.1 °C (98.7 °F)   Ht 1.753 m (5' 9\")   Wt 103 kg (227 lb 9.6 oz)   SpO2 99%   BMI 33.61 kg/m²     Allergies   Allergen Reactions    Cephalosporins Anaphylaxis    Penicillins Rash       Assessment/Plan   Problem List Items Addressed This Visit    None  Visit Diagnoses       Tonsillitis    -  Primary    Relevant Medications    clindamycin (Cleocin) 300 mg capsule    Sore throat        Relevant Orders    Piper-Barr Virus Antibody Panel    BMI 33.0-33.9,adult        Class 1 obesity due to excess calories without serious comorbidity with body mass index (BMI) of 33.0 to 33.9 in adult        Other fatigue              Discussed patient's BMI and to institute calorie reduction and increase exercise to decrease risk of diabetes and heart disease in the future.    Clindamycin prescribed today.   Take this medication with food.   Recommend yogurt and/or probiotics.     If he continues to get infections, we would refer to ENT.     He should try to take Tylenol 3 times daily.     Labs have been ordered, she/he will have these performed and we will contact her/him with results.  (Piper Cox)    If anything worsens or changes please call us at once, follow up in the office as planned.    Scribe Attestation  By signing my name below, I, Yee Castano MA, Scribe   attest that this documentation has been prepared under the direction and in the presence of Arya Dawson DO.      "

## 2024-05-01 NOTE — TELEPHONE ENCOUNTER
Patient aware. Appointment  made for today at 2:45pm.    Arya Dawson, DO  You3 hours ago (8:16 AM)       I would be glad to see him,, if he is worsening,, but I will change the antibiotic,, sometimes these are viral such something like a mono like and they just take time to go away,, let him know if that is okay I will call in the clindamycin prescription for him,, but if he has that many concerns we can squeeze him in today no problem,, let me know either way

## 2024-05-02 ENCOUNTER — TELEPHONE (OUTPATIENT)
Dept: PRIMARY CARE | Facility: CLINIC | Age: 34
End: 2024-05-02
Payer: COMMERCIAL

## 2024-05-02 LAB
EBV EA IGG SER QL: POSITIVE
EBV NA AB SER QL: NEGATIVE
EBV VCA IGG SER IA-ACNC: NEGATIVE
EBV VCA IGM SER IA-ACNC: POSITIVE

## 2024-05-02 NOTE — TELEPHONE ENCOUNTER
----- Message from Arya Dawson DO sent at 5/2/2024 11:26 AM EDT -----  So his monotest were positive that means he has had it over the last 4 to 6 weeks,, and this is why his tonsils and throat are been so painful,,, he does not need any further treatment at this time it is a virus that has to run its course if people have severe sore throat that is worsening we will give him prednisone may be at a lower dose as I know it did not agree with him much in the steroid pack,,, but let him know we do have an answer why he has been feeling so bad,,, hydrate rest,, it will take some time and get better

## 2024-05-02 NOTE — TELEPHONE ENCOUNTER
Patient aware. Patient would like to know if he is contagious and if he is okay to have his dental work done next week.

## 2024-05-02 NOTE — TELEPHONE ENCOUNTER
Not contagious at this point can have his dental work if he wants to       Patient aware. Would like to know if he should continue the antibiotic.

## 2024-05-03 ENCOUNTER — TELEPHONE (OUTPATIENT)
Dept: PRIMARY CARE | Facility: CLINIC | Age: 34
End: 2024-05-03
Payer: COMMERCIAL

## 2024-05-03 ENCOUNTER — DOCUMENTATION (OUTPATIENT)
Dept: BEHAVIORAL HEALTH | Facility: HOSPITAL | Age: 34
End: 2024-05-03
Payer: COMMERCIAL

## 2024-05-03 NOTE — TELEPHONE ENCOUNTER
"Brennon David \"Chico\"  P Do [id number] [facility] Clinical Support Staff (supporting Arya Dawson DO)1 hour ago (10:34 AM)     SS  Also the benadryl seems to have little to no effect at all. I have not taken the medication since I went to sleep the first day I received them, however they seem to be increasing       Brennon Frankie \"Chico\"  P Do [id number] [facility] Clinical [facility] Staff (supporting Arya Dawson DO)1 hour ago (9:58 AM)     SS  Ok, I stopped taking the new medication in the mean time, because I am covered head to knee with hives. Just been taking some benadryl in the mean time.      CALLED PATIENT AND ADVISED HIM TO GO TO THE ER. HIVES HAVE BEEN ONGOING SINCE YESTERDAY MORNING. HAVE NOT GOTTEN BETTER WITH THE USE OF BENADRYL.     KRISTEN  "

## 2024-05-03 NOTE — PROGRESS NOTES
[facility] Psychiatry Consult Note     Brennon David is a 33 y.o., referred to Collaborative Care for symptoms of depression and anxiety. I have reviewed the patient with the behavioral health manager and reviewed the patient's electronic record.    Patient brought in the scales to evaluate for ADHD. [facility] and myself are not trained in interpreting these.    Recommendations:   - M to ask PCP if he would like to use PCP toolkit to interpret. Alternatively, could refer patient to  psychology to fully evaluate for ADHD and subsequent medications management.        Patient Health Questionnaire-9 Score: 16 (4/29/2024  1:53 PM)  ARAMIS-7 Total Score: 12 (4/29/2024  1:54 PM)      The above treatment considerations and suggestions are based on consultations with the patient's care manager and a review of information available in the electronic medical record. I have not personally examined the patient. All recommendations should be implemented with consideration of the patient's relevant prior history and current clinical status. Please feel free to call me with any questions about the care of this patient. I can easily be reached through [employer] or at [email or web address]

## 2024-05-03 NOTE — TELEPHONE ENCOUNTER
----- Message from Brennon David sent at 5/2/2024  4:07 PM EDT -----  Regarding: Rash  Contact: [phone]  So I know I came up positive for the mono. But I'm concerned because a rash developed that I just noticed. Not sure if it is due to the mono or the new antibiotic I was given before we found out it was mono. I'm wondering if I should continue to take the clindamycin.

## 2024-05-03 NOTE — TELEPHONE ENCOUNTER
Stop the clindamycin,,, anything is possible with this,, have him take Zyrtec twice a day Benadryl at bedtime is okay also, it will take some time but the Zyrtec makes him less tired, if he just wants to sleep he can use the Benadryl at nighttime no further steroids, I have left the office at this time       [employer] MESSAGE SENT

## 2024-05-06 ENCOUNTER — TELEPHONE (OUTPATIENT)
Dept: PRIMARY CARE | Facility: CLINIC | Age: 34
End: 2024-05-06
Payer: COMMERCIAL

## 2024-05-06 NOTE — TELEPHONE ENCOUNTER
----- Message from Brennon Dvaid sent at 5/4/2024  4:36 PM EDT -----  Regarding: Rash  Contact: [phone]  ER#

## 2024-05-06 NOTE — TELEPHONE ENCOUNTER
"FYI      Brennon David \"Chico\"  P Do [id number] [facility] Clinical Support Staff (supporting Arya Dawson DO)2 days ago     SS  ER*       Brennon Frankie \"[place]\"  P Do [id number] [facility] Clinical Support Staff (supporting Arya Dawson DO)2 days ago     SS  I went to the Ear yesterday like I was told. They gave me IV with a bunch of meds and stuff and then prescribed me prednisone and pepcid. Pep I'd doesn't seem to be doing much at all, and I'm still completely covered in hives. I suppose it is slightly better, at least on my face and right arm. Just wanted to give you an update  "

## 2024-05-13 ENCOUNTER — SOCIAL WORK (OUTPATIENT)
Dept: PRIMARY CARE | Facility: CLINIC | Age: 34
End: 2024-05-13
Payer: COMMERCIAL

## 2024-05-13 ASSESSMENT — PATIENT HEALTH QUESTIONNAIRE - PHQ9
SUM OF ALL RESPONSES TO PHQ QUESTIONS 1-9: 14
5. POOR APPETITE OR OVEREATING: NEARLY EVERY DAY
2. FEELING DOWN, DEPRESSED OR HOPELESS: MORE THAN HALF THE DAYS
3. TROUBLE FALLING OR STAYING ASLEEP: MORE THAN HALF THE DAYS
SUM OF ALL RESPONSES TO PHQ9 QUESTIONS 1 & 2: 4
8. MOVING OR SPEAKING SO SLOWLY THAT OTHER PEOPLE COULD HAVE NOTICED. OR THE OPPOSITE, BEING SO FIGETY OR RESTLESS THAT YOU HAVE BEEN MOVING AROUND A LOT MORE THAN USUAL: NOT AT ALL
6. FEELING BAD ABOUT YOURSELF - OR THAT YOU ARE A FAILURE OR HAVE LET YOURSELF OR YOUR FAMILY DOWN: SEVERAL DAYS
1. LITTLE INTEREST OR PLEASURE IN DOING THINGS: MORE THAN HALF THE DAYS
4. FEELING TIRED OR HAVING LITTLE ENERGY: SEVERAL DAYS
7. TROUBLE CONCENTRATING ON THINGS, SUCH AS READING THE NEWSPAPER OR WATCHING TELEVISION: NEARLY EVERY DAY
10. IF YOU CHECKED OFF ANY PROBLEMS, HOW DIFFICULT HAVE THESE PROBLEMS MADE IT FOR YOU TO DO YOUR WORK, TAKE CARE OF THINGS AT HOME, OR GET ALONG WITH OTHER PEOPLE: SOMEWHAT DIFFICULT
9. THOUGHTS THAT YOU WOULD BE BETTER OFF DEAD, OR OF HURTING YOURSELF: NOT AT ALL

## 2024-05-13 ASSESSMENT — ANXIETY QUESTIONNAIRES
GAD7 TOTAL SCORE: 10
6. BECOMING EASILY ANNOYED OR IRRITABLE: NEARLY EVERY DAY
IF YOU CHECKED OFF ANY PROBLEMS ON THIS QUESTIONNAIRE, HOW DIFFICULT HAVE THESE PROBLEMS MADE IT FOR YOU TO DO YOUR WORK, TAKE CARE OF THINGS AT HOME, OR GET ALONG WITH OTHER PEOPLE: SOMEWHAT DIFFICULT
7. FEELING AFRAID AS IF SOMETHING AWFUL MIGHT HAPPEN: SEVERAL DAYS
3. WORRYING TOO MUCH ABOUT DIFFERENT THINGS: SEVERAL DAYS
2. NOT BEING ABLE TO STOP OR CONTROL WORRYING: SEVERAL DAYS
4. TROUBLE RELAXING: NOT AT ALL
1. FEELING NERVOUS, ANXIOUS, OR ON EDGE: NEARLY EVERY DAY
5. BEING SO RESTLESS THAT IT IS HARD TO SIT STILL: SEVERAL DAYS

## 2024-05-13 NOTE — PATIENT INSTRUCTIONS
Explore EMDR trauma tx recommendation.  Work on daily schedule (outdoor activity, doing boring, adult task, reduce screen time, socialization).

## 2024-05-13 NOTE — PROGRESS NOTES
Collaborative Care ([facility])  Progress Note    Type of Interaction: In Office    Start Time: 11:40 AM    End Time: 12:36 PM    Appointment: Scheduled    Reason for Visit:   Chief Complaint   Patient presents with    Depression    Anxiety    Review of MH medications, recommendations from consulting psychiatrist, and ongoing options for MH tx (including getting another med recommendations from consulting psychiatrist).      Interval History / Patient Symptoms:   Pt curious about ADHD and did complete BAARS that indicated elevated scores but, not necessarily, confirming an ADHD diagnosis.  Pt wishes to get connected with psychiatrist for clarity and med mgmt.  Pt given psychoeducation about EMDR as helpful tx for trauma hx.  Pt currently recovering from Mono and being ill for several weeks.  Pt noted that he has drastically reduced his cannabis use since last appointment (1-2 times a week).    Patient Health Questionnaire-9 Score: 14 (5/13/2024  2:35 PM)  ARAMIS-7 Total Score: 10 (5/13/2024  2:35 PM)    Interventions Provided: Psychoeducation, Behavioral Activation, Motivational Interviewing, Review Progress/Goals Stress Management, Referrals, Treatment Planning, and Psychoeducation about EMDR and benefits for trauma tx.  Applauded pt for his improvements with reduced cannabis use, eating habit changes, improving sleep schedule.  Discussed pt options for MH tx in [facility] and elsewhere and had pt decide what he feels will be best for him and his MH needs.  Identified importance of working on daily, productive schedule and things to include- evening if not interested in them.  (SMART goal discussion).  (EX: Open two pieces of mail a day) .        Progress Made: Minimum    Response to Intervention: Pt has been able to follow through with suggestions for healthier lifestyle routine (less eating big meals before bed and not waking with stomach aches).  Pr trying to eat more during the day.  Pt has reduced his cannabis use.  Pt  expressed he would be open to learning more about EMDR and a  psychiatry referral for continued MH tx.  Pt agreed to work on daily schedule as discussed in session.  Pt reported not noticing much difference in depression with previous increase in Wellbutrin and would be interested in another medication.  However, pt feels he would like to see psychiatry directly, managing his meds, going forward.    Plan: Pt to work on productive, daily schedule even if not feeling like it, including topics we discussed in session.        Patient Instructions   Explore EMDR trauma tx recommendation.  Work on daily schedule (outdoor activity, doing boring, adult task, reduce screen time, socialization).    Follow Up / Next Appointment: Next appointment: 05/28/24

## 2024-05-15 DIAGNOSIS — F33.1 MODERATE EPISODE OF RECURRENT MAJOR DEPRESSIVE DISORDER (MULTI): ICD-10-CM

## 2024-05-15 DIAGNOSIS — F41.9 ANXIETY: ICD-10-CM

## 2024-05-28 ENCOUNTER — APPOINTMENT (OUTPATIENT)
Dept: PRIMARY CARE | Facility: CLINIC | Age: 34
End: 2024-05-28
Payer: COMMERCIAL

## 2024-05-31 ENCOUNTER — DOCUMENTATION (OUTPATIENT)
Dept: PRIMARY CARE | Facility: CLINIC | Age: 34
End: 2024-05-31
Payer: COMMERCIAL

## 2024-05-31 DIAGNOSIS — F33.1 MDD (MAJOR DEPRESSIVE DISORDER), RECURRENT EPISODE, MODERATE (MULTI): Primary | ICD-10-CM

## 2024-05-31 PROCEDURE — 99493 SBSQ PSYC COLLAB CARE MGMT: CPT | Performed by: FAMILY MEDICINE

## 2024-06-04 ENCOUNTER — TELEPHONE (OUTPATIENT)
Dept: PRIMARY CARE | Facility: CLINIC | Age: 34
End: 2024-06-04

## 2024-06-04 ENCOUNTER — SOCIAL WORK (OUTPATIENT)
Dept: PRIMARY CARE | Facility: CLINIC | Age: 34
End: 2024-06-04
Payer: COMMERCIAL

## 2024-06-04 ASSESSMENT — ANXIETY QUESTIONNAIRES
IF YOU CHECKED OFF ANY PROBLEMS ON THIS QUESTIONNAIRE, HOW DIFFICULT HAVE THESE PROBLEMS MADE IT FOR YOU TO DO YOUR WORK, TAKE CARE OF THINGS AT HOME, OR GET ALONG WITH OTHER PEOPLE: SOMEWHAT DIFFICULT
1. FEELING NERVOUS, ANXIOUS, OR ON EDGE: SEVERAL DAYS
6. BECOMING EASILY ANNOYED OR IRRITABLE: NOT AT ALL
7. FEELING AFRAID AS IF SOMETHING AWFUL MIGHT HAPPEN: SEVERAL DAYS
2. NOT BEING ABLE TO STOP OR CONTROL WORRYING: SEVERAL DAYS
4. TROUBLE RELAXING: SEVERAL DAYS
5. BEING SO RESTLESS THAT IT IS HARD TO SIT STILL: SEVERAL DAYS
3. WORRYING TOO MUCH ABOUT DIFFERENT THINGS: SEVERAL DAYS
GAD7 TOTAL SCORE: 6

## 2024-06-04 ASSESSMENT — PATIENT HEALTH QUESTIONNAIRE - PHQ9
4. FEELING TIRED OR HAVING LITTLE ENERGY: SEVERAL DAYS
5. POOR APPETITE OR OVEREATING: MORE THAN HALF THE DAYS
7. TROUBLE CONCENTRATING ON THINGS, SUCH AS READING THE NEWSPAPER OR WATCHING TELEVISION: MORE THAN HALF THE DAYS
SUM OF ALL RESPONSES TO PHQ QUESTIONS 1-9: 14
6. FEELING BAD ABOUT YOURSELF - OR THAT YOU ARE A FAILURE OR HAVE LET YOURSELF OR YOUR FAMILY DOWN: MORE THAN HALF THE DAYS
3. TROUBLE FALLING OR STAYING ASLEEP: MORE THAN HALF THE DAYS
SUM OF ALL RESPONSES TO PHQ9 QUESTIONS 1 & 2: 4
8. MOVING OR SPEAKING SO SLOWLY THAT OTHER PEOPLE COULD HAVE NOTICED. OR THE OPPOSITE, BEING SO FIGETY OR RESTLESS THAT YOU HAVE BEEN MOVING AROUND A LOT MORE THAN USUAL: SEVERAL DAYS
10. IF YOU CHECKED OFF ANY PROBLEMS, HOW DIFFICULT HAVE THESE PROBLEMS MADE IT FOR YOU TO DO YOUR WORK, TAKE CARE OF THINGS AT HOME, OR GET ALONG WITH OTHER PEOPLE: SOMEWHAT DIFFICULT
9. THOUGHTS THAT YOU WOULD BE BETTER OFF DEAD, OR OF HURTING YOURSELF: NOT AT ALL
1. LITTLE INTEREST OR PLEASURE IN DOING THINGS: MORE THAN HALF THE DAYS
2. FEELING DOWN, DEPRESSED OR HOPELESS: MORE THAN HALF THE DAYS

## 2024-06-04 NOTE — TELEPHONE ENCOUNTER
Patient called [facility] back and had phone call appointment instead d/t pt having trouble connecting to virtual appointment.

## 2024-06-04 NOTE — PROGRESS NOTES
"Collaborative Care ([facility])  Progress Note    Type of Interaction: Phone    Start Time: 1:20 PM    End Time: 2:00 PM    Appointment: Scheduled    Reason for Visit:   Chief Complaint   Patient presents with    Depression    Anxiety    Review of options for future MH treatment again reviewed since last appointment.      Interval History / Patient Symptoms:   Pt reports continued depression and anxiety sx.  Pt having trouble following through on tasks and high distracted by phone and and screens.  Pt plans to find new housing and work towards certification for .  Pt reports he intends to schedule appointment for psychiatry for evaluation and med mgmt going forward.    Patient Health Questionnaire-9 Score: 14 (6/4/2024  2:11 PM)  ARAMIS-7 Total Score: 6 (6/4/2024  2:12 PM)    Interventions Provided: Psychoeducation, Problem Solving Treatment, Behavioral Activation, Homework F/U, Treatment Planning, and Discussion about options for ongoing MH care.   PST and SMART goal setting to identify barriers to completing tasks and strategies to help him be more successful.  BA about course a day for  and scheduling of psychiatry appointment.  Identified recent stressors and processed.  Identified pt's goals for lifestyle habit changes (reduced smoking).      Progress Made: Minimum    Response to Intervention: Pt reported having a stressful few weeks including the death of his cousin's daughter that was one month old.  Pt reports this event \"putting some things in perspective for him\".  Pt reported he does plan to call and schedule an appointment with psychiatry this month.  Pt identified he could work daily on completing one course and work on eliminating distractions and avoiding video games until he is done with his task.      Plan: Schedule psychiatry appt and complete courses for  by next appointment.    Patient Instructions   Work on completion of courses for .    Schedule psychiatry " appointment.    Follow Up / Next Appointment: Next appointment: 06/28/24

## 2024-06-07 ENCOUNTER — DOCUMENTATION (OUTPATIENT)
Dept: BEHAVIORAL HEALTH | Facility: HOSPITAL | Age: 34
End: 2024-06-07
Payer: COMMERCIAL

## 2024-06-07 ENCOUNTER — DOCUMENTATION (OUTPATIENT)
Dept: PRIMARY CARE | Facility: CLINIC | Age: 34
End: 2024-06-07
Payer: COMMERCIAL

## 2024-06-07 NOTE — PROGRESS NOTES
[facility] Psychiatry Consult Note     Brennon David is a 33 y.o., referred to Collaborative Care for symptoms of depression and anxiety. I have reviewed the patient with the behavioral health manager and reviewed the patient's electronic record.    Follow-up regarding medications for depression and anxiety:  PCP did not choose to increase buproprion XL to 450 mg - see his note. Patient has not mentioned any past psychiatric medication that he has tried.    Recommendations:   - For evidence-based treatment, could add lexapro 10 mg daily, tapering up to 20 mg if tolerated, as needed.  - Would not discontinue Wellbutrin at this point for 2 reasons: 1. Based on patient's anxiety scores on questionnaires, patient has objectively improved somewhat since beginning Wellbutrin. Therefore, could utilize the additive effect of lexapro to wellbutrin, to increase the chance of patient's subjective anxiety and depression decreasing.  2. If patient does indeed have ADHD, wellbutrin could be aiding in treating this.  - However, if patient does not wish to take 2 medications at this point, could taper down wellbutrin by 150 mg every 2-3 weeks, while beginning lexapro.        Patient Health Questionnaire-9 Score: 14 (6/4/2024  2:11 PM)  ARAMIS-7 Total Score: 6 (6/4/2024  2:12 PM)      The above treatment considerations and suggestions are based on consultations with the patient's care manager and a review of information available in the electronic medical record. I have not personally examined the patient. All recommendations should be implemented with consideration of the patient's relevant prior history and current clinical status. Please feel free to call me with any questions about the care of this patient. I can easily be reached through [email or web address], or at [email or web address]

## 2024-06-07 NOTE — PROGRESS NOTES
C called and spoke to patient on 6/7/24 re: recent psychiatry consultation and further medications that were recommended.  Reviewed with patient, answered any questions and pt to follow up with PCP appointment to discuss options if interested.

## 2024-06-12 DIAGNOSIS — F32.A DEPRESSION, UNSPECIFIED DEPRESSION TYPE: ICD-10-CM

## 2024-06-12 RX ORDER — BUPROPION HYDROCHLORIDE 300 MG/1
300 TABLET ORAL EVERY MORNING
Qty: 30 TABLET | Refills: 4 | Status: SHIPPED | OUTPATIENT
Start: 2024-06-12

## 2024-06-28 ENCOUNTER — TELEPHONE (OUTPATIENT)
Dept: PRIMARY CARE | Facility: CLINIC | Age: 34
End: 2024-06-28

## 2024-06-28 ENCOUNTER — DOCUMENTATION (OUTPATIENT)
Dept: PRIMARY CARE | Facility: CLINIC | Age: 34
End: 2024-06-28

## 2024-06-28 ENCOUNTER — APPOINTMENT (OUTPATIENT)
Dept: PRIMARY CARE | Facility: CLINIC | Age: 34
End: 2024-06-28
Payer: COMMERCIAL

## 2024-06-28 DIAGNOSIS — F33.1 MODERATE EPISODE OF RECURRENT MAJOR DEPRESSIVE DISORDER (MULTI): Primary | ICD-10-CM

## 2024-07-10 NOTE — TELEPHONE ENCOUNTER
Second outreach 7/10/24:  [facility] sent [employer] message to patient inquiring about scheduling another appointment in [facility].

## 2024-07-23 ENCOUNTER — APPOINTMENT (OUTPATIENT)
Dept: BEHAVIORAL HEALTH | Facility: CLINIC | Age: 34
End: 2024-07-23
Payer: COMMERCIAL

## 2024-07-23 VITALS
DIASTOLIC BLOOD PRESSURE: 82 MMHG | SYSTOLIC BLOOD PRESSURE: 123 MMHG | HEART RATE: 77 BPM | WEIGHT: 249.4 LBS | BODY MASS INDEX: 37.8 KG/M2 | TEMPERATURE: 98 F | HEIGHT: 68 IN

## 2024-07-23 DIAGNOSIS — F41.9 ANXIETY: ICD-10-CM

## 2024-07-23 DIAGNOSIS — F33.1 MODERATE EPISODE OF RECURRENT MAJOR DEPRESSIVE DISORDER (MULTI): Primary | ICD-10-CM

## 2024-07-23 PROCEDURE — 3008F BODY MASS INDEX DOCD: CPT | Performed by: NURSE PRACTITIONER

## 2024-07-23 PROCEDURE — 99204 OFFICE O/P NEW MOD 45 MIN: CPT | Performed by: NURSE PRACTITIONER

## 2024-07-23 PROCEDURE — 1036F TOBACCO NON-USER: CPT | Performed by: NURSE PRACTITIONER

## 2024-07-23 RX ORDER — BUPROPION HYDROCHLORIDE 150 MG/1
150 TABLET ORAL EVERY MORNING
Qty: 7 TABLET | Refills: 0 | Status: SHIPPED | OUTPATIENT
Start: 2024-07-23 | End: 2024-07-30

## 2024-07-23 RX ORDER — FLUOXETINE HYDROCHLORIDE 20 MG/1
20 CAPSULE ORAL DAILY
Qty: 30 CAPSULE | Refills: 1 | Status: SHIPPED | OUTPATIENT
Start: 2024-07-23 | End: 2024-09-21

## 2024-07-23 RX ORDER — HYDROXYZINE PAMOATE 25 MG/1
25 CAPSULE ORAL 3 TIMES DAILY PRN
Qty: 30 CAPSULE | Refills: 1 | Status: SHIPPED | OUTPATIENT
Start: 2024-07-23 | End: 2024-09-21

## 2024-07-23 ASSESSMENT — ANXIETY QUESTIONNAIRES
GAD7 TOTAL SCORE: 10
IF YOU CHECKED OFF ANY PROBLEMS ON THIS QUESTIONNAIRE, HOW DIFFICULT HAVE THESE PROBLEMS MADE IT FOR YOU TO DO YOUR WORK, TAKE CARE OF THINGS AT HOME, OR GET ALONG WITH OTHER PEOPLE: VERY DIFFICULT
2. NOT BEING ABLE TO STOP OR CONTROL WORRYING: MORE THAN HALF THE DAYS
1. FEELING NERVOUS, ANXIOUS, OR ON EDGE: MORE THAN HALF THE DAYS
4. TROUBLE RELAXING: SEVERAL DAYS
3. WORRYING TOO MUCH ABOUT DIFFERENT THINGS: MORE THAN HALF THE DAYS
5. BEING SO RESTLESS THAT IT IS HARD TO SIT STILL: MORE THAN HALF THE DAYS
7. FEELING AFRAID AS IF SOMETHING AWFUL MIGHT HAPPEN: NOT AT ALL
6. BECOMING EASILY ANNOYED OR IRRITABLE: SEVERAL DAYS

## 2024-07-23 ASSESSMENT — PATIENT HEALTH QUESTIONNAIRE - PHQ9
9. THOUGHTS THAT YOU WOULD BE BETTER OFF DEAD, OR OF HURTING YOURSELF: NOT AT ALL
SUM OF ALL RESPONSES TO PHQ9 QUESTIONS 1 & 2: 4
8. MOVING OR SPEAKING SO SLOWLY THAT OTHER PEOPLE COULD HAVE NOTICED. OR THE OPPOSITE, BEING SO FIGETY OR RESTLESS THAT YOU HAVE BEEN MOVING AROUND A LOT MORE THAN USUAL: SEVERAL DAYS
6. FEELING BAD ABOUT YOURSELF - OR THAT YOU ARE A FAILURE OR HAVE LET YOURSELF OR YOUR FAMILY DOWN: NEARLY EVERY DAY
3. TROUBLE FALLING OR STAYING ASLEEP: NEARLY EVERY DAY
5. POOR APPETITE OR OVEREATING: MORE THAN HALF THE DAYS
7. TROUBLE CONCENTRATING ON THINGS, SUCH AS READING THE NEWSPAPER OR WATCHING TELEVISION: NEARLY EVERY DAY
2. FEELING DOWN, DEPRESSED OR HOPELESS: MORE THAN HALF THE DAYS
10. IF YOU CHECKED OFF ANY PROBLEMS, HOW DIFFICULT HAVE THESE PROBLEMS MADE IT FOR YOU TO DO YOUR WORK, TAKE CARE OF THINGS AT HOME, OR GET ALONG WITH OTHER PEOPLE: VERY DIFFICULT
1. LITTLE INTEREST OR PLEASURE IN DOING THINGS: MORE THAN HALF THE DAYS
4. FEELING TIRED OR HAVING LITTLE ENERGY: MORE THAN HALF THE DAYS
SUM OF ALL RESPONSES TO PHQ QUESTIONS 1-9: 18
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
## 2024-07-25 NOTE — TELEPHONE ENCOUNTER
Pt has successfully connected with [facility] on 7/23/24 and pt recommended to continue with psychiatry and connect with [facility] in [place] for therapy.  [facility] services will be closed.

## 2024-08-30 ENCOUNTER — APPOINTMENT (OUTPATIENT)
Dept: BEHAVIORAL HEALTH | Facility: CLINIC | Age: 34
End: 2024-08-30
Payer: COMMERCIAL

## 2024-08-30 VITALS
RESPIRATION RATE: 16 BRPM | DIASTOLIC BLOOD PRESSURE: 74 MMHG | HEIGHT: 68 IN | HEART RATE: 73 BPM | TEMPERATURE: 98.1 F | BODY MASS INDEX: 38.24 KG/M2 | SYSTOLIC BLOOD PRESSURE: 115 MMHG | WEIGHT: 252.3 LBS

## 2024-08-30 DIAGNOSIS — F41.9 ANXIETY: ICD-10-CM

## 2024-08-30 DIAGNOSIS — F33.1 MODERATE EPISODE OF RECURRENT MAJOR DEPRESSIVE DISORDER (MULTI): ICD-10-CM

## 2024-08-30 PROCEDURE — 3008F BODY MASS INDEX DOCD: CPT | Performed by: NURSE PRACTITIONER

## 2024-08-30 PROCEDURE — 99214 OFFICE O/P EST MOD 30 MIN: CPT | Performed by: NURSE PRACTITIONER

## 2024-08-30 RX ORDER — HYDROXYZINE HYDROCHLORIDE 10 MG/1
10 TABLET, FILM COATED ORAL 3 TIMES DAILY PRN
Qty: 30 TABLET | Refills: 1 | Status: SHIPPED | OUTPATIENT
Start: 2024-08-30 | End: 2024-10-29

## 2024-08-30 RX ORDER — FLUOXETINE 10 MG/1
30 CAPSULE ORAL DAILY
Qty: 90 CAPSULE | Refills: 1 | Status: SHIPPED | OUTPATIENT
Start: 2024-08-30 | End: 2024-10-29

## 2024-08-30 ASSESSMENT — ANXIETY QUESTIONNAIRES
2. NOT BEING ABLE TO STOP OR CONTROL WORRYING: SEVERAL DAYS
6. BECOMING EASILY ANNOYED OR IRRITABLE: SEVERAL DAYS
1. FEELING NERVOUS, ANXIOUS, OR ON EDGE: SEVERAL DAYS
GAD7 TOTAL SCORE: 7
5. BEING SO RESTLESS THAT IT IS HARD TO SIT STILL: MORE THAN HALF THE DAYS
7. FEELING AFRAID AS IF SOMETHING AWFUL MIGHT HAPPEN: NOT AT ALL
3. WORRYING TOO MUCH ABOUT DIFFERENT THINGS: SEVERAL DAYS
IF YOU CHECKED OFF ANY PROBLEMS ON THIS QUESTIONNAIRE, HOW DIFFICULT HAVE THESE PROBLEMS MADE IT FOR YOU TO DO YOUR WORK, TAKE CARE OF THINGS AT HOME, OR GET ALONG WITH OTHER PEOPLE: SOMEWHAT DIFFICULT
4. TROUBLE RELAXING: SEVERAL DAYS

## 2024-08-30 ASSESSMENT — PATIENT HEALTH QUESTIONNAIRE - PHQ9
5. POOR APPETITE OR OVEREATING: NEARLY EVERY DAY
4. FEELING TIRED OR HAVING LITTLE ENERGY: MORE THAN HALF THE DAYS
8. MOVING OR SPEAKING SO SLOWLY THAT OTHER PEOPLE COULD HAVE NOTICED. OR THE OPPOSITE, BEING SO FIGETY OR RESTLESS THAT YOU HAVE BEEN MOVING AROUND A LOT MORE THAN USUAL: MORE THAN HALF THE DAYS
2. FEELING DOWN, DEPRESSED OR HOPELESS: MORE THAN HALF THE DAYS
1. LITTLE INTEREST OR PLEASURE IN DOING THINGS: MORE THAN HALF THE DAYS
7. TROUBLE CONCENTRATING ON THINGS, SUCH AS READING THE NEWSPAPER OR WATCHING TELEVISION: MORE THAN HALF THE DAYS
6. FEELING BAD ABOUT YOURSELF - OR THAT YOU ARE A FAILURE OR HAVE LET YOURSELF OR YOUR FAMILY DOWN: NEARLY EVERY DAY
SUM OF ALL RESPONSES TO PHQ QUESTIONS 1-9: 19
9. THOUGHTS THAT YOU WOULD BE BETTER OFF DEAD, OR OF HURTING YOURSELF: NOT AT ALL
10. IF YOU CHECKED OFF ANY PROBLEMS, HOW DIFFICULT HAVE THESE PROBLEMS MADE IT FOR YOU TO DO YOUR WORK, TAKE CARE OF THINGS AT HOME, OR GET ALONG WITH OTHER PEOPLE: VERY DIFFICULT
SUM OF ALL RESPONSES TO PHQ9 QUESTIONS 1 & 2: 4
3. TROUBLE FALLING OR STAYING ASLEEP: NEARLY EVERY DAY

## 2024-08-30 NOTE — PROGRESS NOTES
"Time in: 1039  Time out: 1115      Preferred Name:  Chico    Chief Complaint  \"Things are about the same.\"       History of Present Illness:  Chico David is a 34 y.o. male patient with a chief complaint of depression and anxiety presenting to outpatient treatment for a scheduled psych outpatient psychiatric follow-up.    The patient reports, \"Things are about the same.\" He states that he is \"same old same old.\" He reports that he is moving out of his current place with his old boss and moving in with his father to work towards getting back on track, back into a routine. He reports that he has been avoiding his current roommate because this person has \"given up and doesn't care.\" He reports that although the patient has \"given up, I do care.\" He reports that he has only missed about 2 doses of the Prozac. He reports that he does notice that it has decreased his sexual libido but that this is a good thing. He reports that it has been easier to focus on things that are more  important. He reports that he is still not working. He reports that he still hasn't been looking. He reports that he let 2 job opportunities go by recently. He reports that he is still \"kicking myself in the butt\" for missing out on a job opportunity. He reports that he still has enough in his savings to last another year but does not want to diminish his savings completely. He reports that he feels that if he did not have his savings, he would have more motivation to get a job. He reports that he had fewer days of staying in bed and sleeping a little better. He reports that moving in with his father will give him a \"kick in the ass\" to get back on track. He reports that he plans to move with his father within the month. He talks about looking forward to getting back on his own in the future. He denies any current suicidal ideation. He reports feeling helpless due to ongoing lack of motivation. He reports that he continues to have anxiety. He reports " "that he has been using the Vistaril 1-2 times per day a few times per week for a couple weeks. He reports that \"it spaced my mind\" while drinking alcohol and had taken the Vistaril. He reports that he cut down his alcohol use because of this. He reports that he felt heightened episodes of anxiety in the past month while working with his father. He feels the Vistaril was possibly making his anxiety worse. He reports that his anxiety has been better overall in the past month. Although he acknowledges that his sleep has improved, he states that it is still not great. 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 He acknowledges small gains in the past month.       ADULT Psychiatric Review of Symptoms:  ARAMIS: difficult to control worry, excessive anxiety/worry, difficulty concentrating, irritability, restlessness, See HPI, and but states that the severity of his symptoms have improved related to the anxiety    Depression: Depressed mood, appetite decrease, concentration poor, Energy decreased, helpless, sleep decreased, lack of motivation, and See HPI    Delirium: Denies    Psychosis: Denies    Mary Anne: Denies    Safety Issues: weapons in home        Falls  History of falls: No  Have you fallen in the past 12 months: No      High Blood pressure  No      Depression Screening:   PHQ9 score: 19  Plan: Medication, Therapy, and Follow up with this writer      Anxiety Screening:  ARAMIS-7 Score: 7  Plan: Medication, Therapy, and Follow up with this writer      Patient Health Questionnaire-9 Score: 19 (8/30/2024 10:57 AM)  ARAMIS-7 Total Score: 7 (8/30/2024 10:58 AM)         Record Review: brief      Mental Status Exam:  General appearance: Appears state age, appropriate eye contact, adequate grooming and hygiene  Attitude: Calm, cooperative, and engaged in conversation.  Behavior: Appropriate eye contact. " "  Motor Activity: No psychomotor agitation or retardation. No abnormal movements, tremors or tics. No evidence of extrapyramidal symptoms or tardive dyskinesia.  Speech: Regular rate, rhythm, volume. Spontaneous, no pressured speech.  Mood: \"Blah.\"   Affect: Appropriate, full range, mood congruent.  Thought Process: Linear, logical, and goal-directed. No loose associations or gross thought disorganization.  Thought Content: Denied current suicidal ideation or thoughts of harm to self, denied homicidal ideation or thoughts of harm to others. No delusional thinking elicited. No perseverations or obsessions identified.   Perception: Did not endorse auditory or visual hallucinations, did not appear to be responding to hallucinatory stimuli.   Cognition: Alert, oriented x3. Preserved attention span and concentration, recent and remote memory. Adequate fund of knowledge. No deficits in language.   Insight: Good, in regards to understanding mental health condition  Judgement: Good        Review of Systems  Eyes  no discharge, no pain  Ears, Nose, Mouth, Throat  no pain, no rhinorrhea, no dysphagia  Resp  no dyspnea, no cough  GI  no nausea, vomiting, diarrhea    no urgency, no dysuria  Muskuloskeletal  no pain, no weakness  Integumentary  no rash  Endocrine   no polyurea  Hematologic  no bruising, no bleeding problems  CV  no palpitations, no pain  Pulm  no chest pain  Neuro  no weakness, no coordination problems, no dizziness  Constitutional  energy, appetite normal  Psychiatric  see psychiatric review of systems and HPI        Vitals:  Vitals:    08/30/24 1038   BP: 115/74   Pulse: 73   Resp: 16   Temp: 36.7 °C (98.1 °F)           OARRS:  Raquel Rodriguez, VANITA-DARREL on 8/30/2024 10:38 AM  I have personally reviewed the OARRS report for Brennon David. I have considered the risks of abuse, dependence, addiction and diversion        Current Medications  Current Outpatient Medications on File Prior to Visit   Medication " "Sig Dispense Refill    [DISCONTINUED] FLUoxetine (PROzac) 20 mg capsule Take 1 capsule (20 mg) by mouth once daily. 30 capsule 1    [DISCONTINUED] hydrOXYzine pamoate (VistariL) 25 mg capsule Take 1 capsule (25 mg) by mouth 3 times a day as needed for anxiety. 30 capsule 1    [DISCONTINUED] buPROPion XL (Wellbutrin XL) 150 mg 24 hr tablet Take 1 tablet (150 mg) by mouth once daily in the morning for 7 days. Do not crush, chew, or split. 7 tablet 0     No current facility-administered medications on file prior to visit.         MEDICAL-DECISION MAKING  Moderate: 1 or more chronic illnesses with exacerbation, progression, or side effects of treatment with Prescription drug management          IMPRESSION  This is a 33-year-old male presents for follow-up for treatment of depression and anxiety after initiating Prozac and discontinuing Wellbutrin.  The patient does note improvement in his anxiety symptoms in the past month.  He is unsure if the hydroxyzine has been worsening his anxiety or not.  He is agreeable to trialing a lower dose of hydroxyzine to see if this is beneficial.  He is also agreeable to having the Prozac increased to 30 mg daily for better management of depression.  He does continue to endorse symptoms of depression.  He does endorse symptoms that correlate with major depressive disorder, recurrent, moderate and unspecified anxiety.  The patient denies current suicidal ideation and denies any prior suicide attempts.  He has not found a therapist yet but states that he will attempt to find a therapist in the next month.  He is also hopeful that moving in with his father will help him get back on track with his life.  He is agreeable to following up in 5 weeks.       Risk Assessment  Acute risk for suicide: Low  Chronic risk for suicide: Low      Brennon \"Chico\" was seen today for anxiety and depression.  Diagnoses and all orders for this visit:  Anxiety  -     Follow Up In Psychiatry  -     hydrOXYzine HCL " (Atarax) 10 mg tablet; Take 1 tablet (10 mg) by mouth 3 times a day as needed for anxiety.  -     FLUoxetine (PROzac) 10 mg capsule; Take 3 capsules (30 mg) by mouth once daily.  -     Follow Up In Psychiatry; Future  Moderate episode of recurrent major depressive disorder (Multi)  -     Follow Up In Psychiatry  -     FLUoxetine (PROzac) 10 mg capsule; Take 3 capsules (30 mg) by mouth once daily.  -     Follow Up In Psychiatry; Future          SI/HI ASSESSMENT  -Risk Assessment: Brennon David is currently a low acute risk of suicide and self-harm due to no past suicide attempt(s) and not currently endorsing thoughts of suicide. Brennon David is currently a low acute risk of violence and harm to others due to no past history of violence and not currently threatening others.  -Suicidal Risk Factors: , male, unmarried/single, and current psychiatric illness  -Violence Risk Factors: male, access to weapons, and unemployment  -Protective Factors: strong coping skills, sense of responsibility towards family, social support/connectedness, positive family relationships, and hopefulness/future orientation  -Plan to Reduce Risk: Establish medication regimen, outpatient follow-up care, and increase coping skills .         Tate suicide severity rating scale  Suicidal and self-injurious behavior in the past 3 months: denies     Suicidal and self-injurious behavior in lifetime: Denies     Suicidal ideation (check most severe in past month): Denies     Activating events (recent):  Unemployment, mother's death last year     Treatment history: Previous psychiatric diagnosis and treatment, and Current medications/treatment     Other risk factors:  Male, Not working, No children, and Not      Clinical status (recent): major depressive disorder     Protective factors (recent): Identifies reasons for living, responsibility to family or others, living with family, and supportive social network or family      Other protective factors:  Denies recent suicidal ideation, future oriented     Describe any suicidal, self-injurious, or aggressive behavior (include dates): Denies        PLAN  -Increase Prozac 30 mg by mouth daily for better management of depression and anxiety; prescription sent for Prozac 10 mg capsules, take 3 capsules by mouth daily, dispense 90 with 1 refill  -Decrease hydroxyzine 10 mg by mouth 3 times daily as needed for anxiety; prescription sent for hydroxyzine 10 mg about 3 times daily as needed for anxiety, dispense 30 with 1 refill  -Follow-up with this provider on 10/4/2024.  -Risks/benefits/assessment of medication interventions discussed with pt; pt agreeable to plan. Will continue to monitor for symptoms management and side effects and adjust plan as needed.  -Motivational interviewing to increase coping skills/behavior regulation.  -Safety plan reviewed.  -Call  Psychiatry at [phone] or communicate through [employer] with issues .   -For [facility] residents, [employer] is a 24/7 hotline you can call for assistance at [phone]) [phone]. Please call [phone] or go to your closest Emergency Room if you feel worse. This includes thoughts of hurting yourself or anyone else, or having other troubles such as hearing voices, seeing visions, or having new and scary thoughts about the people around you.      Review with patient: Treatment plan reviewed with the patient.  Medication risks/benefit reviewed with the patient      Time Spent:    Prep time: 2 minutes  Direct patient time: 36 minutes  Documentation time: 7 minutes  Total time: 45 minutes    VANITA Gomez-CNP

## 2024-10-04 ENCOUNTER — APPOINTMENT (OUTPATIENT)
Dept: BEHAVIORAL HEALTH | Facility: CLINIC | Age: 34
End: 2024-10-04
Payer: COMMERCIAL

## 2024-11-06 DIAGNOSIS — F33.1 MODERATE EPISODE OF RECURRENT MAJOR DEPRESSIVE DISORDER: ICD-10-CM

## 2024-11-06 DIAGNOSIS — F41.9 ANXIETY: ICD-10-CM

## 2024-11-06 RX ORDER — FLUOXETINE 10 MG/1
30 CAPSULE ORAL DAILY
Qty: 90 CAPSULE | Refills: 1 | Status: SHIPPED | OUTPATIENT
Start: 2024-11-06 | End: 2025-01-05